# Patient Record
Sex: MALE | Race: WHITE | NOT HISPANIC OR LATINO | URBAN - METROPOLITAN AREA
[De-identification: names, ages, dates, MRNs, and addresses within clinical notes are randomized per-mention and may not be internally consistent; named-entity substitution may affect disease eponyms.]

---

## 2018-12-29 ENCOUNTER — WALK IN (OUTPATIENT)
Dept: URGENT CARE | Age: 23
End: 2018-12-29

## 2018-12-29 VITALS
HEART RATE: 78 BPM | DIASTOLIC BLOOD PRESSURE: 72 MMHG | WEIGHT: 150 LBS | TEMPERATURE: 98.4 F | BODY MASS INDEX: 20.32 KG/M2 | RESPIRATION RATE: 18 BRPM | HEIGHT: 72 IN | OXYGEN SATURATION: 97 % | SYSTOLIC BLOOD PRESSURE: 128 MMHG

## 2018-12-29 DIAGNOSIS — H66.001 ACUTE SUPPURATIVE OTITIS MEDIA OF RIGHT EAR WITHOUT SPONTANEOUS RUPTURE OF TYMPANIC MEMBRANE, RECURRENCE NOT SPECIFIED: Primary | ICD-10-CM

## 2018-12-29 PROCEDURE — 99204 OFFICE O/P NEW MOD 45 MIN: CPT | Performed by: NURSE PRACTITIONER

## 2018-12-29 RX ORDER — AMOXICILLIN 875 MG/1
875 TABLET, COATED ORAL 2 TIMES DAILY
Qty: 20 TABLET | Refills: 0 | Status: SHIPPED | OUTPATIENT
Start: 2018-12-29 | End: 2019-01-08

## 2018-12-29 SDOH — HEALTH STABILITY: MENTAL HEALTH: HOW OFTEN DO YOU HAVE A DRINK CONTAINING ALCOHOL?: NEVER

## 2018-12-29 ASSESSMENT — ENCOUNTER SYMPTOMS
DIARRHEA: 0
APPETITE CHANGE: 0
COUGH: 0
BACK PAIN: 0
VOMITING: 0
RHINORRHEA: 1
WOUND: 0
HEADACHES: 0
NUMBNESS: 0
ABDOMINAL PAIN: 0
FEVER: 0
CHILLS: 0
SHORTNESS OF BREATH: 0
DIZZINESS: 0
WEAKNESS: 0
EYE PAIN: 0
SORE THROAT: 0
NAUSEA: 0
FATIGUE: 0

## 2020-09-21 ENCOUNTER — OFFICE VISIT (OUTPATIENT)
Dept: INTERNAL MEDICINE | Age: 25
End: 2020-09-21

## 2020-09-21 ENCOUNTER — TELEPHONE (OUTPATIENT)
Dept: INTERNAL MEDICINE | Age: 25
End: 2020-09-21

## 2020-09-21 VITALS
OXYGEN SATURATION: 99 % | HEART RATE: 75 BPM | BODY MASS INDEX: 20.91 KG/M2 | HEIGHT: 72 IN | TEMPERATURE: 98 F | RESPIRATION RATE: 16 BRPM | DIASTOLIC BLOOD PRESSURE: 80 MMHG | SYSTOLIC BLOOD PRESSURE: 122 MMHG | WEIGHT: 154.4 LBS

## 2020-09-21 DIAGNOSIS — F10.20 UNCOMPLICATED ALCOHOL DEPENDENCE (CMD): Primary | ICD-10-CM

## 2020-09-21 PROCEDURE — 99202 OFFICE O/P NEW SF 15 MIN: CPT | Performed by: INTERNAL MEDICINE

## 2020-09-21 SDOH — HEALTH STABILITY: MENTAL HEALTH: HOW OFTEN DO YOU HAVE A DRINK CONTAINING ALCOHOL?: 4 OR MORE TIMES A WEEK

## 2020-09-21 SDOH — HEALTH STABILITY: PHYSICAL HEALTH: ON AVERAGE, HOW MANY MINUTES DO YOU ENGAGE IN EXERCISE AT THIS LEVEL?: 90 MIN

## 2020-09-21 SDOH — HEALTH STABILITY: PHYSICAL HEALTH: ON AVERAGE, HOW MANY DAYS PER WEEK DO YOU ENGAGE IN MODERATE TO STRENUOUS EXERCISE (LIKE A BRISK WALK)?: 6 DAYS

## 2020-09-21 ASSESSMENT — PATIENT HEALTH QUESTIONNAIRE - PHQ9
2. FEELING DOWN, DEPRESSED OR HOPELESS: NOT AT ALL
CLINICAL INTERPRETATION OF PHQ9 SCORE: NO FURTHER SCREENING NEEDED
1. LITTLE INTEREST OR PLEASURE IN DOING THINGS: NOT AT ALL
SUM OF ALL RESPONSES TO PHQ9 QUESTIONS 1 AND 2: 0
CLINICAL INTERPRETATION OF PHQ2 SCORE: NO FURTHER SCREENING NEEDED
SUM OF ALL RESPONSES TO PHQ9 QUESTIONS 1 AND 2: 0

## 2020-09-26 ENCOUNTER — TELEPHONE (OUTPATIENT)
Dept: INTERNAL MEDICINE | Age: 25
End: 2020-09-26

## 2020-10-10 ENCOUNTER — APPOINTMENT (OUTPATIENT)
Dept: INTERNAL MEDICINE | Age: 25
End: 2020-10-10

## 2024-01-24 ENCOUNTER — OFFICE VISIT (OUTPATIENT)
Dept: FAMILY MEDICINE CLINIC | Facility: CLINIC | Age: 29
End: 2024-01-24
Payer: COMMERCIAL

## 2024-01-24 VITALS
OXYGEN SATURATION: 97 % | SYSTOLIC BLOOD PRESSURE: 122 MMHG | WEIGHT: 205 LBS | BODY MASS INDEX: 27.77 KG/M2 | HEART RATE: 92 BPM | DIASTOLIC BLOOD PRESSURE: 80 MMHG | TEMPERATURE: 97 F | HEIGHT: 72 IN

## 2024-01-24 DIAGNOSIS — Z11.3 SCREEN FOR STD (SEXUALLY TRANSMITTED DISEASE): ICD-10-CM

## 2024-01-24 DIAGNOSIS — Z00.00 GENERAL MEDICAL EXAM: ICD-10-CM

## 2024-01-24 DIAGNOSIS — Z76.89 ENCOUNTER TO ESTABLISH CARE: Primary | ICD-10-CM

## 2024-01-24 DIAGNOSIS — R41.840 CONCENTRATION DEFICIT: ICD-10-CM

## 2024-01-24 DIAGNOSIS — L98.9 SKIN LESION OF FACE: ICD-10-CM

## 2024-01-24 DIAGNOSIS — F41.9 ANXIETY: ICD-10-CM

## 2024-01-24 DIAGNOSIS — Z12.5 SCREENING FOR PROSTATE CANCER: ICD-10-CM

## 2024-01-24 DIAGNOSIS — Z23 NEED FOR VACCINATION: ICD-10-CM

## 2024-01-24 PROCEDURE — 3008F BODY MASS INDEX DOCD: CPT | Performed by: NURSE PRACTITIONER

## 2024-01-24 PROCEDURE — 3074F SYST BP LT 130 MM HG: CPT | Performed by: NURSE PRACTITIONER

## 2024-01-24 PROCEDURE — 99385 PREV VISIT NEW AGE 18-39: CPT | Performed by: NURSE PRACTITIONER

## 2024-01-24 PROCEDURE — 3079F DIAST BP 80-89 MM HG: CPT | Performed by: NURSE PRACTITIONER

## 2024-01-24 NOTE — PROGRESS NOTES
HPI:   Patient is here to establish care and for physical today.    Concerns: Needs referral for psychiatrist and dermatology.   Has spot on face and abdomen he would like derm to look at  Has history of ADHD, alcohol abuse and anxiety. Not interested in therapy at this time but would like to see psychiatrist.    Exercise: walking on treadmill    Diet: balanced  Occupation: lawn care in summer, laid off in winter    Wt Readings from Last 6 Encounters:   01/24/24 205 lb (93 kg)     Body mass index is 27.8 kg/m².   Immunization History   Administered Date(s) Administered    DTAP INFANRIX 04/13/1993, 06/15/1993, 08/25/1993, 10/14/1994, 06/24/1998    HEP B, Ped/Adol 02/17/1993, 04/13/1993, 08/21/1997    Hib, Unspecified Formulation 04/13/1993, 06/15/1993, 08/25/1993, 06/27/1994    Influenza 04/13/1993, 06/15/1993, 08/25/1993, 06/27/1994    MMR 06/29/1994, 06/24/1998    Meningococcal-Menactra 04/17/2007    OPV 04/13/1993, 06/15/1993, 10/04/1994, 06/24/1998    TDAP 04/17/2007   Pended Date(s) Pended    TDAP 01/24/2024     No results found for: \"CHOLEST\", \"HDL\", \"LDL\", \"TRIGLY\", \"AST\", \"ALT\"     Allergies:  No Known Allergies   Current Meds:  No current outpatient medications on file prior to visit.     No current facility-administered medications on file prior to visit.        History:  History reviewed. No pertinent past medical history.   History reviewed. No pertinent surgical history.   History reviewed. No pertinent family history.   No family status information on file.      Social History     Socioeconomic History    Marital status: Single   Tobacco Use    Smoking status: Never   Vaping Use    Vaping Use: Never used   Substance and Sexual Activity    Alcohol use: Yes     Alcohol/week: 2.0 - 3.0 standard drinks of alcohol     Types: 2 - 3 Cans of beer per week     Comment: few times a week    Drug use: No        REVIEW OF SYSTEMS:   Review of Systems   Constitutional:  Negative for appetite change, chills, fatigue,  fever and unexpected weight change.   HENT:  Negative for congestion, ear pain, postnasal drip, rhinorrhea, sore throat and trouble swallowing.    Respiratory:  Negative for cough and shortness of breath.    Cardiovascular:  Negative for chest pain and palpitations.   Gastrointestinal:  Negative for abdominal pain, constipation, diarrhea, nausea and vomiting.   Endocrine: Negative for cold intolerance and heat intolerance.   Genitourinary:  Negative for dysuria and frequency.   Musculoskeletal:  Negative for myalgias.   Skin:  Negative for rash.        + skin lesion   Neurological:  Negative for headaches.   Psychiatric/Behavioral:  Positive for decreased concentration. Negative for dysphoric mood, sleep disturbance and suicidal ideas. The patient is nervous/anxious.         Objective   EXAM:   /80   Pulse 92   Temp 97.4 °F (36.3 °C)   Ht 6' (1.829 m)   Wt 205 lb (93 kg)   SpO2 97%   BMI 27.80 kg/m²   Ideal body weight: 77.6 kg (171 lb 1.2 oz)  Adjusted ideal body weight: 83.8 kg (184 lb 10.3 oz)   Physical Exam  Constitutional:       General: He is not in acute distress.     Appearance: Normal appearance. He is well-developed and well-groomed. He is not ill-appearing.   HENT:      Right Ear: Tympanic membrane, ear canal and external ear normal.      Left Ear: Tympanic membrane, ear canal and external ear normal.      Nose: Nose normal.      Mouth/Throat:      Mouth: Mucous membranes are moist.      Pharynx: Oropharynx is clear.   Eyes:      Conjunctiva/sclera: Conjunctivae normal.      Pupils: Pupils are equal, round, and reactive to light.   Neck:      Thyroid: No thyromegaly.   Cardiovascular:      Rate and Rhythm: Normal rate and regular rhythm.      Heart sounds: Normal heart sounds.   Pulmonary:      Effort: Pulmonary effort is normal.      Breath sounds: Normal breath sounds.   Abdominal:      General: Bowel sounds are normal.      Palpations: Abdomen is soft.      Tenderness: There is no  abdominal tenderness.   Musculoskeletal:         General: Normal range of motion.      Cervical back: Normal range of motion.   Skin:     General: Skin is warm and dry.   Neurological:      Mental Status: He is alert and oriented to person, place, and time.      Cranial Nerves: No cranial nerve deficit.   Psychiatric:         Mood and Affect: Mood normal. Affect is flat.         Speech: Speech normal.         Behavior: Behavior normal.          ASSESSMENT AND PLAN     Diagnoses and all orders for this visit:    Encounter to establish care    General medical exam  -     TSH W Reflex To Free T4  -     Lipid Panel  -     Comp Metabolic Panel (14)  -     CBC With Differential With Platelet    Need for vaccination  -     TdaP (Adacel, Boostrix) [36784]    Anxiety  -     [facility] Referral - In Network    Concentration deficit  -     [facility] Referral - In Network    Skin lesion of face  -     Cancel: DERM - EXTERNAL  -     DERM - EXTERNAL    Screening for prostate cancer    Screen for STD (sexually transmitted disease)  -     T Pallidum Screening [place]  -     HIV AG AB Combo  -     Urine Chlamydia/GC Amplification  -     Hepatitis B Surface Antigen  -     Herpes Simplex Virus I/II IgG (Blood antibodies)    Labs ordered  See psych and derm, referrals placed

## 2024-01-26 LAB
ABSOLUTE BASOPHILS: 71 CELLS/UL (ref 0–200)
ABSOLUTE EOSINOPHILS: 99 CELLS/UL (ref 15–500)
ABSOLUTE LYMPHOCYTES: 1311 CELLS/UL (ref 850–3900)
ABSOLUTE MONOCYTES: 310 CELLS/UL (ref 200–950)
ABSOLUTE NEUTROPHILS: 2909 CELLS/UL (ref 1500–7800)
ALBUMIN/GLOBULIN RATIO: 1.5 (CALC) (ref 1–2.5)
ALBUMIN: 4.8 G/DL (ref 3.6–5.1)
ALKALINE PHOSPHATASE: 79 U/L (ref 36–130)
ALT: 52 U/L (ref 9–46)
AST: 27 U/L (ref 10–40)
BASOPHILS: 1.5 %
BILIRUBIN, TOTAL: 1 MG/DL (ref 0.2–1.2)
BUN: 15 MG/DL (ref 7–25)
CALCIUM: 10.1 MG/DL (ref 8.6–10.3)
CARBON DIOXIDE: 28 MMOL/L (ref 20–32)
CHLAMYDIA TRACHOMATIS$RNA, TMA: NOT DETECTED
CHLORIDE: 102 MMOL/L (ref 98–110)
CHOL/HDLC RATIO: 4.3 (CALC)
CHOLESTEROL, TOTAL: 182 MG/DL
CREATININE: 0.97 MG/DL (ref 0.6–1.26)
EGFR: 108 ML/MIN/1.73M2
EOSINOPHILS: 2.1 %
GLOBULIN: 3.2 G/DL (CALC) (ref 1.9–3.7)
GLUCOSE: 98 MG/DL (ref 65–139)
HDL CHOLESTEROL: 42 MG/DL
HEMATOCRIT: 44.3 % (ref 38.5–50)
HEMOGLOBIN: 15 G/DL (ref 13.2–17.1)
HSV 1 IGG TYPE SPECIFIC$AB: <0.9 INDEX
HSV 2 IGG TYPE SPECIFIC$AB: <0.9 INDEX
LDL-CHOLESTEROL: 115 MG/DL (CALC)
LYMPHOCYTES: 27.9 %
MCH: 30.4 PG (ref 27–33)
MCHC: 33.9 G/DL (ref 32–36)
MCV: 89.7 FL (ref 80–100)
MONOCYTES: 6.6 %
MPV: 9.5 FL (ref 7.5–12.5)
NEISSERIA GONORRHOEAE$RNA, TMA: NOT DETECTED
NEUTROPHILS: 61.9 %
NON-HDL CHOLESTEROL: 140 MG/DL (CALC)
PLATELET COUNT: 323 THOUSAND/UL (ref 140–400)
POTASSIUM: 4.3 MMOL/L (ref 3.5–5.3)
PROTEIN, TOTAL: 8 G/DL (ref 6.1–8.1)
RDW: 12.2 % (ref 11–15)
RED BLOOD CELL COUNT: 4.94 MILLION/UL (ref 4.2–5.8)
SODIUM: 140 MMOL/L (ref 135–146)
T. PALLIDUM AB, EIA: NEGATIVE
TRIGLYCERIDES: 134 MG/DL
TSH W/REFLEX TO FT4: 2.24 MIU/L (ref 0.4–4.5)
WHITE BLOOD CELL COUNT: 4.7 THOUSAND/UL (ref 3.8–10.8)

## 2024-01-27 ENCOUNTER — TELEPHONE (OUTPATIENT)
Dept: FAMILY MEDICINE CLINIC | Facility: CLINIC | Age: 29
End: 2024-01-27

## 2024-01-27 DIAGNOSIS — R74.01 ELEVATED ALT MEASUREMENT: Primary | ICD-10-CM

## 2024-01-27 NOTE — TELEPHONE ENCOUNTER
----- Message from DIANDRA Guerrero sent at 1/27/2024  8:10 AM CST -----  Mildly elevated LDL, work on heart healthy diet. Recheck 1 year  Chemistries normal except for mild ALT. Recheck in 1 month for comparison  Thyroid normal  No anemia  Syphilis, HIV, Chlamydia, Gonorrhea, Hepatitis B, HSV screening negative

## 2024-02-01 ENCOUNTER — TELEPHONE (OUTPATIENT)
Dept: FAMILY MEDICINE CLINIC | Facility: CLINIC | Age: 29
End: 2024-02-01

## 2024-02-01 DIAGNOSIS — L98.9 SKIN LESION OF FACE: Primary | ICD-10-CM

## 2024-02-01 NOTE — TELEPHONE ENCOUNTER
Pt called said he would need a new referral with DR. Velázquez with  [facility].fax referral to [phone]. Per pt other referral was book out far

## 2024-02-03 PROBLEM — F19.20 POLYSUBSTANCE DEPENDENCE (HCC): Status: ACTIVE | Noted: 2024-02-03

## 2024-02-03 PROBLEM — F19.90 POLYSUBSTANCE USE DISORDER: Status: ACTIVE | Noted: 2024-02-03

## 2024-02-03 PROBLEM — F41.1 GAD (GENERALIZED ANXIETY DISORDER): Status: ACTIVE | Noted: 2024-02-03

## 2024-02-09 ENCOUNTER — TELEPHONE (OUTPATIENT)
Dept: FAMILY MEDICINE CLINIC | Facility: CLINIC | Age: 29
End: 2024-02-09

## 2024-02-09 NOTE — TELEPHONE ENCOUNTER
Pt called and scheduled apt for   Future Appointments   Date Time Provider Department Center   2/10/2024  8:15 AM Celina Menard APRN [facility]   2/21/2024  4:30 PM Melina Andrews APRN [facility]     Pt wondering if he can get tetanus shot at apt. Please advise

## 2024-02-10 ENCOUNTER — OFFICE VISIT (OUTPATIENT)
Dept: FAMILY MEDICINE CLINIC | Facility: CLINIC | Age: 29
End: 2024-02-10
Payer: COMMERCIAL

## 2024-02-10 VITALS
OXYGEN SATURATION: 98 % | SYSTOLIC BLOOD PRESSURE: 128 MMHG | WEIGHT: 202 LBS | BODY MASS INDEX: 27.36 KG/M2 | HEIGHT: 72 IN | TEMPERATURE: 97 F | HEART RATE: 87 BPM | DIASTOLIC BLOOD PRESSURE: 76 MMHG | RESPIRATION RATE: 16 BRPM

## 2024-02-10 DIAGNOSIS — Z23 NEED FOR VACCINATION: ICD-10-CM

## 2024-02-10 DIAGNOSIS — R51.9 ACUTE NONINTRACTABLE HEADACHE, UNSPECIFIED HEADACHE TYPE: Primary | ICD-10-CM

## 2024-02-10 PROCEDURE — 90471 IMMUNIZATION ADMIN: CPT | Performed by: NURSE PRACTITIONER

## 2024-02-10 PROCEDURE — 99213 OFFICE O/P EST LOW 20 MIN: CPT | Performed by: NURSE PRACTITIONER

## 2024-02-10 PROCEDURE — 90715 TDAP VACCINE 7 YRS/> IM: CPT | Performed by: NURSE PRACTITIONER

## 2024-02-10 NOTE — PROGRESS NOTES
HPI:     Patient is here for pain on left side of temple. Started a few weeks ago and is improving. Reports having a small pimple in the area. He popped it and some white pus drained. Not sure if it is related. Pain wraps around ear. No pain with eating, drinking or swallowing. Overall pain is improving. Has not taken anything for pain.    Also here to get vaccines updated.     Current Outpatient Medications   Medication Sig Dispense Refill    escitalopram (LEXAPRO) 10 MG Oral Tab Take half tab[5mg] by mouth daily for 7 days then increase to 10 mg 30 tablet 0    buPROPion ER (WELLBUTRIN XL) 150 MG Oral Tablet 24 Hr Take 1 tablet (150 mg total) by mouth every morning. 30 tablet 0      History reviewed. No pertinent past medical history.   History reviewed. No pertinent surgical history.   Family History   Problem Relation Age of Onset    Anxiety Mother       Social History     Socioeconomic History    Marital status: Single   Tobacco Use    Smoking status: Never   Vaping Use    Vaping Use: Never used   Substance and Sexual Activity    Alcohol use: Yes     Alcohol/week: 2.0 - 3.0 standard drinks of alcohol     Types: 2 - 3 Cans of beer per week     Comment: few times a week    Drug use: Yes     Types: Cannabis, LSD, \"Crack\" cocaine, Opiods     Comment: currently sober from all substances         REVIEW OF SYSTEMS:   Review of Systems   Constitutional:  Negative for chills, fatigue and fever.   HENT:  Negative for congestion, ear pain, rhinorrhea, sore throat and trouble swallowing.    Respiratory:  Negative for cough.    Cardiovascular:  Negative for chest pain.   Musculoskeletal:  Negative for neck pain and neck stiffness.       EXAM:   /76   Pulse 87   Temp 97 °F (36.1 °C) (Temporal)   Resp 16   Ht 6' (1.829 m)   Wt 202 lb (91.6 kg)   SpO2 98%   BMI 27.40 kg/m²   Physical Exam  Constitutional:       General: He is not in acute distress.     Appearance: Normal appearance.   HENT:      Head:      Jaw:  There is normal jaw occlusion.        Right Ear: Tympanic membrane, ear canal and external ear normal.      Left Ear: Tympanic membrane, ear canal and external ear normal.      Nose: Nose normal.      Mouth/Throat:      Mouth: Mucous membranes are moist.      Pharynx: Oropharynx is clear.   Cardiovascular:      Rate and Rhythm: Normal rate and regular rhythm.      Heart sounds: Normal heart sounds.   Pulmonary:      Effort: Pulmonary effort is normal.      Breath sounds: Normal breath sounds.   Neurological:      Mental Status: He is alert.         ASSESSMENT AND PLAN:   Diagnoses and all orders for this visit:    Acute nonintractable headache, unspecified headache type    Improving  Can apply ice/heat, PRN Nsaid  If persisting, follow up

## 2024-02-16 ENCOUNTER — TELEPHONE (OUTPATIENT)
Dept: FAMILY MEDICINE CLINIC | Facility: CLINIC | Age: 29
End: 2024-02-16

## 2024-02-16 NOTE — TELEPHONE ENCOUNTER
LMT[facility]-referral authorized through his ins   can you reload his ins? Thanks!      Per Collette Slameron: Ari Oneil! This referral is now Authorized. You may want to have your  reload the insurance as a [facility] HMO. It is loaded incorrectly as a [facility] Choice which does not require a Authorization. These will never fall into our [employer] for processing. Have a great weekend! :)

## 2024-02-16 NOTE — TELEPHONE ENCOUNTER
Message sent to tasneem gregg in ref dept  Referral was placed for Dr Amarjit Velázquez on 2/1/24

## 2024-02-23 ENCOUNTER — TELEPHONE (OUTPATIENT)
Dept: FAMILY MEDICINE CLINIC | Facility: CLINIC | Age: 29
End: 2024-02-23

## 2024-02-23 NOTE — TELEPHONE ENCOUNTER
Repeat labs due  MCM sent  Future Appointments   Date Time Provider Department Center   3/13/2024  5:00 PM Melina Andrews APRN [facility]

## 2024-05-28 ENCOUNTER — TELEPHONE (OUTPATIENT)
Dept: FAMILY MEDICINE CLINIC | Facility: CLINIC | Age: 29
End: 2024-05-28

## 2024-05-28 DIAGNOSIS — M54.50 LOW BACK PAIN, UNSPECIFIED BACK PAIN LATERALITY, UNSPECIFIED CHRONICITY, UNSPECIFIED WHETHER SCIATICA PRESENT: Primary | ICD-10-CM

## 2024-05-28 DIAGNOSIS — M25.519 SHOULDER PAIN, UNSPECIFIED CHRONICITY, UNSPECIFIED LATERALITY: ICD-10-CM

## 2024-05-28 NOTE — TELEPHONE ENCOUNTER
Patient wants to get a referral for a Chiropractor,  states he is having lower back and shoulder pain.  Patient has HMO insurance   Last px 1/24/24  Will patient need to be seen prior to referral?

## 2024-05-31 ENCOUNTER — TELEPHONE (OUTPATIENT)
Dept: FAMILY MEDICINE CLINIC | Facility: CLINIC | Age: 29
End: 2024-05-31

## 2024-05-31 NOTE — TELEPHONE ENCOUNTER
Referral faxed    PATIENT NAME:  LISSA CORDERO/ [phone] (home)/ There is no work phone number on file.  PATIENT :  1995  REFERRAL ID #:  [id number]  REFERRAL STATUS:  Authorized [1]  REVIEW REFERRAL NOTES FOR MORE INFORMATION:  DATE AUTHORIZED:  2024 // EXPIRATION DATE: 2025

## 2024-05-31 NOTE — TELEPHONE ENCOUNTER
Bartolome from [facility] called and wanted to check status of patient's referral for Dr. Mendoza. Per Bartloome, she needs the referral submitted through the portal and she does not see anything there. Bartolome states best call back number for questions is [phone] and fax number is [phone]. Please advise

## 2024-06-19 ENCOUNTER — OFFICE VISIT (OUTPATIENT)
Dept: FAMILY MEDICINE CLINIC | Facility: CLINIC | Age: 29
End: 2024-06-19

## 2024-06-19 VITALS
SYSTOLIC BLOOD PRESSURE: 120 MMHG | BODY MASS INDEX: 27 KG/M2 | OXYGEN SATURATION: 97 % | TEMPERATURE: 98 F | HEART RATE: 74 BPM | WEIGHT: 197 LBS | DIASTOLIC BLOOD PRESSURE: 72 MMHG

## 2024-06-19 DIAGNOSIS — M54.50 CHRONIC MIDLINE LOW BACK PAIN WITHOUT SCIATICA: Primary | ICD-10-CM

## 2024-06-19 DIAGNOSIS — M54.9 UPPER BACK PAIN: ICD-10-CM

## 2024-06-19 DIAGNOSIS — G89.29 CHRONIC MIDLINE LOW BACK PAIN WITHOUT SCIATICA: Primary | ICD-10-CM

## 2024-06-19 PROCEDURE — 3078F DIAST BP <80 MM HG: CPT | Performed by: NURSE PRACTITIONER

## 2024-06-19 PROCEDURE — 3074F SYST BP LT 130 MM HG: CPT | Performed by: NURSE PRACTITIONER

## 2024-06-19 PROCEDURE — 99213 OFFICE O/P EST LOW 20 MIN: CPT | Performed by: NURSE PRACTITIONER

## 2024-06-19 NOTE — PROGRESS NOTES
HPI:   Back Pain  This is a chronic problem. Episode onset: several years ago. The problem has been gradually worsening (over last few months) since onset. The pain is present in the lumbar spine. The quality of the pain is described as aching. The pain does not radiate. The symptoms are aggravated by bending and position (walking longer than 30 minutes). Stiffness is present All day. Pertinent negatives include no bladder incontinence, bowel incontinence, fever, numbness, paresis, paresthesias or tingling. He has tried nothing for the symptoms.   Shoulder Pain   Pain location: between shoulder blades. This is a chronic problem. Episode onset: several years ago. The problem has been waxing and waning. The quality of the pain is described as aching. Associated symptoms include a limited range of motion (sometimes, it is painful). Pertinent negatives include no fever, numbness or tingling. The symptoms are aggravated by activity. He has tried nothing for the symptoms.        Current Outpatient Medications   Medication Sig Dispense Refill    escitalopram (LEXAPRO) 10 MG Oral Tab Take 1 tablet (10 mg total) by mouth daily. 90 tablet 0    buPROPion ER (WELLBUTRIN XL) 300 MG Oral Tablet 24 Hr Take 1 tablet (300 mg total) by mouth every morning. 90 tablet 0    hydrOXYzine Pamoate (VISTARIL) 25 MG Oral Cap Take 1-2 capsules (25-50 mg total) by mouth daily as needed for Anxiety. 60 capsule 1      History reviewed. No pertinent past medical history.   History reviewed. No pertinent surgical history.   Family History   Problem Relation Age of Onset    Anxiety Mother         Suspected      Social History     Socioeconomic History    Marital status: Single   Tobacco Use    Smoking status: Never   Vaping Use    Vaping status: Never Used   Substance and Sexual Activity    Alcohol use: Yes     Alcohol/week: 2.0 - 3.0 standard drinks of alcohol     Types: 2 - 3 Cans of beer per week     Comment: few times a week    Drug use: Yes      Types: Cannabis, LSD, \"Crack\" cocaine, Opiods     Comment: currently sober from all substances     Social Determinants of Health     Physical Activity: Sufficiently Active (9/21/2020)    [facility] [employer] [employer]    Exercise Vital Sign     Days of Exercise per Week: 6 days     Minutes of Exercise per Session: 90 min         REVIEW OF SYSTEMS:   Review of Systems   Constitutional:  Negative for chills, fatigue and fever.   Gastrointestinal:  Negative for bowel incontinence.   Genitourinary:  Negative for bladder incontinence.   Musculoskeletal:  Positive for back pain. Negative for gait problem and joint swelling.   Neurological:  Negative for tingling, numbness and paresthesias.       EXAM:   /72   Pulse 74   Temp 97.6 °F (36.4 °C)   Wt 197 lb (89.4 kg)   SpO2 97%   BMI 26.72 kg/m²   Physical Exam  Constitutional:       General: He is not in acute distress.     Appearance: Normal appearance.   Musculoskeletal:      Right shoulder: No bony tenderness or crepitus. Normal range of motion.      Left shoulder: No bony tenderness or crepitus. Normal range of motion.      Lumbar back: No swelling or spasms. Decreased range of motion (decreased forward flexion). Negative right straight leg raise test and negative left straight leg raise test.   Neurological:      General: No focal deficit present.      Mental Status: He is alert.   Psychiatric:         Mood and Affect: Mood normal.         ASSESSMENT AND PLAN:   Diagnoses and all orders for this visit:    Chronic midline low back pain without sciatica  -     Physical Therapy Referral - [facility]    Upper back pain  -     Physical Therapy Referral - [facility]    Start physical therapy  Call/message with update in 4 weeks

## 2024-11-20 ENCOUNTER — OFFICE VISIT (OUTPATIENT)
Dept: FAMILY MEDICINE CLINIC | Facility: CLINIC | Age: 29
End: 2024-11-20
Payer: COMMERCIAL

## 2024-11-20 VITALS
WEIGHT: 201 LBS | HEART RATE: 73 BPM | OXYGEN SATURATION: 96 % | DIASTOLIC BLOOD PRESSURE: 80 MMHG | SYSTOLIC BLOOD PRESSURE: 116 MMHG | TEMPERATURE: 97 F | BODY MASS INDEX: 27 KG/M2

## 2024-11-20 DIAGNOSIS — M79.671 FOOT PAIN, BILATERAL: ICD-10-CM

## 2024-11-20 DIAGNOSIS — M79.671 HEEL PAIN, BILATERAL: Primary | ICD-10-CM

## 2024-11-20 DIAGNOSIS — M79.672 FOOT PAIN, BILATERAL: ICD-10-CM

## 2024-11-20 DIAGNOSIS — M79.672 HEEL PAIN, BILATERAL: Primary | ICD-10-CM

## 2024-11-20 PROCEDURE — 99212 OFFICE O/P EST SF 10 MIN: CPT | Performed by: NURSE PRACTITIONER

## 2024-11-20 PROCEDURE — 3074F SYST BP LT 130 MM HG: CPT | Performed by: NURSE PRACTITIONER

## 2024-11-20 PROCEDURE — 3079F DIAST BP 80-89 MM HG: CPT | Performed by: NURSE PRACTITIONER

## 2024-11-20 NOTE — PROGRESS NOTES
HPI:     Patient is here to discuss foot pain and referral. Has been experiencing bilateral heel pain that radiates to toes. Started few months ago. Not worsening but not improving. Worst throughout the day as he has been standing for long periods of time. Trying ice and home stretches with minimal improvement.    Current Outpatient Medications   Medication Sig Dispense Refill    buPROPion ER (WELLBUTRIN XL) 300 MG Oral Tablet 24 Hr Take 1 tablet (300 mg total) by mouth every morning. 90 tablet 0    escitalopram (LEXAPRO) 20 MG Oral Tab Take 1 tablet (20 mg total) by mouth daily. 90 tablet 0    hydrOXYzine Pamoate (VISTARIL) 25 MG Oral Cap Take 1-2 capsules (25-50 mg total) by mouth daily as needed for Anxiety. 60 capsule 1      No past medical history on file.   No past surgical history on file.   Family History   Problem Relation Age of Onset    Anxiety Mother         Suspected      Social History     Socioeconomic History    Marital status: Single   Tobacco Use    Smoking status: Never   Vaping Use    Vaping status: Never Used   Substance and Sexual Activity    Alcohol use: Yes     Alcohol/week: 2.0 - 3.0 standard drinks of alcohol     Types: 2 - 3 Cans of beer per week     Comment: few times a week    Drug use: Yes     Types: Cannabis, LSD, \"Crack\" cocaine, Opiods     Comment: currently sober from all substances     Social Drivers of Health     Physical Activity: Sufficiently Active (9/21/2020)    Received from [employer], [employer]    Exercise Vital Sign     Days of Exercise per Week: 6 days     Minutes of Exercise per Session: 90 min         REVIEW OF SYSTEMS:   Review of Systems   Musculoskeletal:  Negative for gait problem and joint swelling.       EXAM:   /80   Pulse 73   Temp 97 °F (36.1 °C)   Wt 201 lb (91.2 kg)   SpO2 96%   BMI 27.26 kg/m²   Physical Exam  Constitutional:       General: He is not in acute distress.     Appearance: Normal appearance.   Musculoskeletal:       Right foot: Normal range of motion. Tenderness present. No bony tenderness.      Left foot: Normal range of motion. Tenderness present. No bony tenderness.        Legs:    Neurological:      Mental Status: He is alert.         ASSESSMENT AND PLAN:   Diagnoses and all orders for this visit:    Heel pain, bilateral  -     Podiatry Referral - In Network    Foot pain, bilateral  -     Podiatry Referral - In Network    Referral for podiatry placed  Recommend plantar fasciitis braces at night

## 2024-12-10 ENCOUNTER — OFFICE VISIT (OUTPATIENT)
Dept: PODIATRY CLINIC | Facility: CLINIC | Age: 29
End: 2024-12-10

## 2024-12-10 ENCOUNTER — PATIENT MESSAGE (OUTPATIENT)
Dept: FAMILY MEDICINE CLINIC | Facility: CLINIC | Age: 29
End: 2024-12-10

## 2024-12-10 DIAGNOSIS — M76.61 ACHILLES TENDONITIS, BILATERAL: ICD-10-CM

## 2024-12-10 DIAGNOSIS — M79.671 HEEL PAIN, BILATERAL: Primary | ICD-10-CM

## 2024-12-10 DIAGNOSIS — M79.672 FOOT PAIN, BILATERAL: ICD-10-CM

## 2024-12-10 DIAGNOSIS — M79.671 FOOT PAIN, BILATERAL: ICD-10-CM

## 2024-12-10 DIAGNOSIS — M79.672 HEEL PAIN, BILATERAL: Primary | ICD-10-CM

## 2024-12-10 DIAGNOSIS — M72.2 PLANTAR FASCIITIS, BILATERAL: Primary | ICD-10-CM

## 2024-12-10 DIAGNOSIS — M76.62 ACHILLES TENDONITIS, BILATERAL: ICD-10-CM

## 2024-12-10 PROCEDURE — 99204 OFFICE O/P NEW MOD 45 MIN: CPT | Performed by: PODIATRIST

## 2024-12-10 RX ORDER — METHYLPREDNISOLONE 4 MG/1
TABLET ORAL
Qty: 21 TABLET | Refills: 0 | Status: SHIPPED | OUTPATIENT
Start: 2024-12-10

## 2024-12-16 ENCOUNTER — TELEPHONE (OUTPATIENT)
Dept: FAMILY MEDICINE CLINIC | Facility: CLINIC | Age: 29
End: 2024-12-16

## 2024-12-23 ENCOUNTER — TELEPHONE (OUTPATIENT)
Dept: FAMILY MEDICINE CLINIC | Facility: CLINIC | Age: 29
End: 2024-12-23

## 2024-12-23 DIAGNOSIS — M79.672 HEEL PAIN, BILATERAL: Primary | ICD-10-CM

## 2024-12-23 DIAGNOSIS — M79.671 HEEL PAIN, BILATERAL: Primary | ICD-10-CM

## 2024-12-23 DIAGNOSIS — M79.671 FOOT PAIN, BILATERAL: ICD-10-CM

## 2024-12-23 DIAGNOSIS — M79.672 FOOT PAIN, BILATERAL: ICD-10-CM

## 2024-12-23 NOTE — TELEPHONE ENCOUNTER
Patient called said that podiatrist he needs to see physical therapy.     [facility] so referrals has to come from pcp. Per patient he would like to be seen some where closer to him.

## 2024-12-23 NOTE — TELEPHONE ENCOUNTER
See office visit notes from 12/10/24 with Kathleen Snowden    -Patient elected for physical therapy, over-the-counter inserts and Medrol Dosepak.  Discussed that if his symptoms do not improve within a few weeks recommend reaching out for possible further imaging.     Referral pended

## 2024-12-23 NOTE — TELEPHONE ENCOUNTER
Left message to call office back  Referral for PT with [facility] placed  Please give scheduling phone number   [phone]

## 2025-01-28 ENCOUNTER — TELEPHONE (OUTPATIENT)
Dept: PHYSICAL THERAPY | Facility: HOSPITAL | Age: 30
End: 2025-01-28

## 2025-01-29 ENCOUNTER — OFFICE VISIT (OUTPATIENT)
Facility: LOCATION | Age: 30
End: 2025-01-29
Attending: PODIATRIST
Payer: COMMERCIAL

## 2025-01-29 DIAGNOSIS — M72.2 PLANTAR FASCIITIS, BILATERAL: Primary | ICD-10-CM

## 2025-01-29 PROCEDURE — 97162 PT EVAL MOD COMPLEX 30 MIN: CPT

## 2025-01-29 PROCEDURE — 97110 THERAPEUTIC EXERCISES: CPT

## 2025-01-29 NOTE — PROGRESS NOTES
LOWER EXTREMITY EVALUATION:     Diagnosis:   Heel pain, bilateral (M79.671,M79.672)  Foot pain, bilateral (M79.671,M79.672) Patient:  Americo Martinez (29 year old, male)        Referring Provider: Kathleen Snowden  Today's Date   1/29/2025    Precautions:  None   Date of Evaluation: 01/29/25  Next MD visit: No data recorded  Date of Surgery: N/A     PATIENT SUMMARY   Summary of chief complaints: (B) heel & plantar surface foot pain  History of current condition: Has been experiencing (B) heel and foot pain for several months now, no injury noted. Followed up with podiatrist and determined to have plantar fasciitis & achilles tendinitis. He works at a [employer] which entails being on his feet all day. He can stand for about 5 minutes before experiencing increased pain. If he walks it will feel fine for a bit but after about 45 minutes to an hour he will experience progressive increase in pain. His (L) heel and plantar surface experiences pain even if he is sitting. He denied having any increased pain with first steps in the morning however. He has hx of low back pain, but denies any paresthesia. No issues prior to this occurrence. He does have orthotics in his shoes which have helped to a certain extent   Pain level: current 0 /10, at best 0 /10, at worst 10 /10  Description of symptoms: Shooting sensation from the heel to the toes both feet   Occupation:    Leisure activities/Hobbies: Playing basketball, going for walks   Prior level of function: Previous to a few months ago, never had any issues  Current limitations: standing for more than 5-10 minutes, walking for more than 45 minutes to an hour, steps  Pt goals: Decrease pain, improve flexibility/mobility, strength, ability to stand and walk at work and with everyday activity  Past medical history was reviewed with Americo.    Imaging/Tests: see chart   Americo  has no past medical history on file.  He  has no past surgical history on  file.    ASSESSMENT  Americo presents to physical therapy evaluation with primary c/o (B) heel & plantar surface foot pain. The results of the objective tests and measures show decreased (B) ankle ROM, decreased LE flexibility, rearfoot valgus bilaterally (left more prominent than right), mild decrease in strength, gait impairment, balance deficit, mobility restrictions. Functional deficits include but are not limited to standing for more than 5-10 minutes, walking for more than 45 minutes to an hour, steps. Signs and symptoms are consistent with diagnosis of plantar fasciitis and potentially achilles tendinitis. Pt and PT discussed evaluation findings, pathology, POC and HEP.  Pt voiced understanding and performs HEP correctly without reported pain. Skilled Physical Therapy is medically necessary to address the above impairments and reach functional goals.    OBJECTIVE:   Musculoskeletal  Observation: Knee genu varum, (B) forefoot abduction, (B) rearfoot valgus (most prominent on left)    Palpation: Mild tenderness noted (B) medial calcaneal tubercle and medial longitudinal arch     Accessory Motion: Mild TC joint mobility restriction     DORIS ROM and Strength: (* denotes performed with pain)  Ankle/Foot   AROM MMT (-/5)    R L R L     PF 55 degrees 55 degrees 4-/5 4-/5     DF (L4) 8 degrees 10 degrees 5/5 5/5     Inversion 25 degrees 15 degrees 4/5 4/5     Eversion 10 degrees 10 degrees 5/5 5/5     Grt Toe Ext (L5)               Flexibility:  LE Flexibility R L     Hip Flexor         Hamstrings Mod to max tightness Mod to max tightness     ITB         Piriformis         Quads Mod tightness Mod tightness     Gastroc-soleus Mod to max tightness Mod to max tightness     Neurological:  Sensation: Intact     Balance and Functional Mobility:  Gait: pt ambulates on level ground with non-antalgic gait, pes planus and over pronation, abducted forefoot bilaterally     Today's Treatment and Response:   Pt education was provided  on exam findings, treatment diagnosis, treatment plan, expectations, and prognosis.  Today's Treatment       1/29/2025   Treatment   Therapeutic Exercise Standing gastroc stretch 30 sec x 3  Standing soleus stretch 30 sec x 3  H/L HS stretch 30 sec x 3  Seated PF ball rolls   Demonstration & return demonstration of exercises above    Therapeutic Exercise Min 15   Evaluation Min 30   Total of Timed Procedures 15   Total of Service Based 30   Total Treatment Time 45         Patient was instructed in and issued a HEP for: Access Code: [email or web address]  URL: [email or web address]  Date: 01/29/2025  Prepared by: Estuardo Mi    Exercises  - Gastroc Stretch on Wall  - 2 x daily - 7 x weekly - 3 sets - 30 seconds hold  - Soleus Stretch on Wall  - 2 x daily - 7 x weekly - 3 sets - 30 seconds hold  - Seated Plantar Fascia Mobilization with Small Ball  - 2 x daily - 7 x weekly - 1 sets - 2-3 min hold  - Supine Hamstring Stretch  - 2 x daily - 7 x weekly - 3 sets - 30 seconds hold    Charges:  PT EVAL: Moderate Complexity, TherEx 1  In agreement with evaluation findings and clinical rationale, this evaluation involved MODERATE COMPLEXITY decision making due to 1-2 personal factors/comorbidities, 3 or more body structures involved/activity limitations, and evolving symptoms as documented in the evaluation.                                                                                                                PLAN OF CARE:    Goals: (to be met in 5 visits)   Goals       Therapy Goals     Pt will report 50% reduction in symptoms rated at worst from 10/10 to 5/10  Pt will report being able to stand for 30 minutes or greater with less than 3/10 pain  Pt will report being able to walk for 45 minutes or greater with less than 3/10 pain  Pt will demonstrate AROM (B) ankle DF > 10 degrees in order for proper walking mechanics   Pt will demonstrate at 5/5 (B) hip/knee/ankle strength in order to maximize  effectiveness and efficiency with functional activities   Pt will verbalize understanding contributing factors regarding his symptoms including flexibility/mobility restrictions, biomechanical dysfunctions, strength deficits, movement patterns etc  Pt will perform HEP independently in order to sustain changes made with therapy sessions               Frequency / Duration: Patient will be seen 2x/week or a total of 5  visits over a 90 day period. Treatment will include: Gait training; Manual Therapy; Neuromuscular Re-education; Self-Care Home Management; Therapeutic Activities; Therapeutic Exercise; Home Exercise Program instruction    Education or treatment limitation: None   Rehab Potential: good     LEFS Score  LEFS Score: (Patient-Rptd) 90 % (1/29/2025  3:49 PM)    Patient/Family/Caregiver was advised of these findings, precautions, and treatment options and has agreed to actively participate in planning and for this course of care.    Thank you for your referral. Please co-sign or sign and return this letter via fax as soon as possible to [phone]. If you have any questions, please contact me at Dept: [phone]    Sincerely,  Electronically signed by therapist: Estuardo Mi, PT  Physician's certification required: Yes  I certify the need for these services furnished under this plan of treatment and while under my care.    X___________________________________________________ Date____________________    Certification From: 1/29/2025  To:4/29/2025

## 2025-02-03 ENCOUNTER — OFFICE VISIT (OUTPATIENT)
Facility: LOCATION | Age: 30
End: 2025-02-03
Attending: PODIATRIST
Payer: COMMERCIAL

## 2025-02-03 PROCEDURE — 97140 MANUAL THERAPY 1/> REGIONS: CPT

## 2025-02-03 PROCEDURE — 97110 THERAPEUTIC EXERCISES: CPT

## 2025-02-04 NOTE — PROGRESS NOTES
Patient: Americo Martinez (29 year old, male) Referring Provider:  Insurance:   Diagnosis: Heel pain, bilateral (M79.671,M79.672)  Foot pain, bilateral (M79.671,M79.672) Kathleen Snowden  [facility]   Date of Surgery: N/A Next MD visit:  N/A   Precautions:  None No data recorded Referral Information:    Date of Evaluation: Req: 5, Auth: 5, Exp: 4/30/2025 01/29/25 POC Auth Visits:  5       Today's Date   2/3/2025    Subjective  Pain at work today was slightly better today than usual. Has not done exercises consistently but has tried the stretches       Pain: 4/10     Objective  Mild tenderness (L) medial calcaneal tubercle       Assessment  Pes planus, increased foot/ankle pronation. Notable increased rearfoot valgus and forefoot abduction (L) side. Tolerated treatment session well with no increases in pain. Discussed importance of HEP consistency for optimal outcomes    Goals (to be met in 5 visits)   Goals       Therapy Goals     Pt will report 50% reduction in symptoms rated at worst from 10/10 to 5/10  Pt will report being able to stand for 30 minutes or greater with less than 3/10 pain  Pt will report being able to walk for 45 minutes or greater with less than 3/10 pain  Pt will demonstrate AROM (B) ankle DF > 10 degrees in order for proper walking mechanics   Pt will demonstrate at 5/5 (B) hip/knee/ankle strength in order to maximize effectiveness and efficiency with functional activities   Pt will verbalize understanding contributing factors regarding his symptoms including flexibility/mobility restrictions, biomechanical dysfunctions, strength deficits, movement patterns etc  Pt will perform HEP independently in order to sustain changes made with therapy sessions               Plan  Potential leuko tape to help support arch of foot    Treatment Last 4 Visits       1/29/2025 2/3/2025   Treatment   Treatment Day  2   Therapeutic Exercise Standing gastroc stretch 30 sec x 3  Standing soleus stretch 30 sec  x 3  H/L HS stretch 30 sec x 3  Seated PF ball rolls   Demonstration & return demonstration of exercises above  NuStep x 6 min   Seated PF lacrosse ball rolls x 2 min each  Seated arch lifts x 10 (5 second hold)   SB gastroc stretch 30 seconds x 3  SB soleus stretch 30 sec x 3  HS stretch on step 30 sec x 2 (B)  Standing heel raises (lacrosse ball wedged between heels) 2 x 10    Manual Therapy  STM/MFR (L) gastroc/soleus & PF    Therapeutic Exercise Min 15 25   Manual Therapy Min  15   Evaluation Min 30    Total of Timed Procedures 15 40   Total of Service Based 30 0   Total Treatment Time 45 40         HEP  Access Code: [email or web address]  URL: [email or web address]  Date: 01/29/2025  Prepared by: Estuardo Mi     Exercises  - Gastroc Stretch on Wall  - 2 x daily - 7 x weekly - 3 sets - 30 seconds hold  - Soleus Stretch on Wall  - 2 x daily - 7 x weekly - 3 sets - 30 seconds hold  - Seated Plantar Fascia Mobilization with Small Ball  - 2 x daily - 7 x weekly - 1 sets - 2-3 min hold  - Supine Hamstring Stretch  - 2 x daily - 7 x weekly - 3 sets - 30 seconds hold      Charges     TherEx 2, Manual 1

## 2025-02-05 ENCOUNTER — OFFICE VISIT (OUTPATIENT)
Facility: LOCATION | Age: 30
End: 2025-02-05
Attending: PODIATRIST
Payer: COMMERCIAL

## 2025-02-05 PROCEDURE — 97140 MANUAL THERAPY 1/> REGIONS: CPT

## 2025-02-05 PROCEDURE — 97110 THERAPEUTIC EXERCISES: CPT

## 2025-02-06 NOTE — PROGRESS NOTES
Patient: Americo Martinez (29 year old, male) Referring Provider:  Insurance:   Diagnosis: Heel pain, bilateral (M79.671,M79.672)  Foot pain, bilateral (M79.671,M79.672) [facility]   Date of Surgery: N/A Next MD visit:  N/A   Precautions:  None No data recorded Referral Information:    Date of Evaluation: Req: 5, Auth: 5, Exp: 4/30/2025 01/29/25 POC Auth Visits:  5       Today's Date   2/5/2025    Subjective  Has not had any (L) foot pain while at rest (which he usually does). Some pain at work today but not sharp. 3/10 at work       Pain: 0/10     Objective  Mild tenderness (L) medial calcaneal tubercle. (B) foot over pronation      Assessment  Pes planus & increased pronation bilaterally. Demonstrates tibialis posterior weakness bilaterally, left more weak than right. Tolerated exercises well with no reports of increased pain.    Goals (to be met in 5 visits)   Goals       Therapy Goals     Pt will report 50% reduction in symptoms rated at worst from 10/10 to 5/10  Pt will report being able to stand for 30 minutes or greater with less than 3/10 pain  Pt will report being able to walk for 45 minutes or greater with less than 3/10 pain  Pt will demonstrate AROM (B) ankle DF > 10 degrees in order for proper walking mechanics   Pt will demonstrate at 5/5 (B) hip/knee/ankle strength in order to maximize effectiveness and efficiency with functional activities   Pt will verbalize understanding contributing factors regarding his symptoms including flexibility/mobility restrictions, biomechanical dysfunctions, strength deficits, movement patterns etc  Pt will perform HEP independently in order to sustain changes made with therapy sessions               Plan  Continue soft tissue treatment as needed, address ankle stability, gentle stretching/flexibility exercises    Treatment Last 4 Visits       1/29/2025 2/3/2025 2/5/2025   PT Treatment   Treatment Day  2 3   Therapeutic Exercise Standing gastroc  stretch 30 sec x 3  Standing soleus stretch 30 sec x 3  H/L HS stretch 30 sec x 3  Seated PF ball rolls   Demonstration & return demonstration of exercises above  NuStep x 6 min   Seated PF lacrosse ball rolls x 2 min each  Seated arch lifts x 10 (5 second hold)   SB gastroc stretch 30 seconds x 3  SB soleus stretch 30 sec x 3  HS stretch on step 30 sec x 2 (B)  Standing heel raises (lacrosse ball wedged between heels) 2 x 10  Seated arch lifts x 20 (B)   Seated posterior tib resistance exercise x 10 (B)   SB calf stretch (gastroc) 30 sec x 3  SB calf stretch (soleus) 30 sec x 3  HS stretch on step 30 sec x 3 (B)  Standing heel raise (lacrosse ball between ankles) x 20   Side step w/GTB x 2 laps  HEP review, updated, handout provided        Manual Therapy  STM/MFR (L) gastroc/soleus & PF  STM/MFR (L) gastroc & soleus    Therapeutic Exercise Min 15 25 30   Manual Therapy Min  15 12   Evaluation Min 30     Total of Timed Procedures 15 40 42   Total of Service Based 30 0 0   Total Treatment Time 45 40 42         HEP    Access Code: [email or web address]  URL: [email or web address]  Date: 02/05/2025  Prepared by: Estuardo Mi    Exercises  - Gastroc Stretch on Wall  - 2 x daily - 7 x weekly - 3 sets - 30 seconds hold  - Soleus Stretch on Wall  - 2 x daily - 7 x weekly - 3 sets - 30 seconds hold  - Seated Plantar Fascia Mobilization with Small Ball  - 2 x daily - 7 x weekly - 1 sets - 2-3 min hold  - Supine Hamstring Stretch  - 2 x daily - 7 x weekly - 3 sets - 30 seconds hold  - Standing Hamstring Stretch with Step  - 2 x daily - 7 x weekly - 3 sets - 30 seconds hold  - Standing Heel Raise  - 2 x daily - 7 x weekly - 2 sets - 10 reps  - Seated Arch Lifts  - 2 x daily - 7 x weekly - 1 sets - 10 reps - 5 seconds hold  - Seated Figure 4 Ankle Inversion with Resistance  - 2 x daily - 7 x weekly - 2 sets - 10 reps      Charges     TherEx 2, Manual 1

## 2025-02-10 ENCOUNTER — OFFICE VISIT (OUTPATIENT)
Facility: LOCATION | Age: 30
End: 2025-02-10
Attending: PODIATRIST
Payer: COMMERCIAL

## 2025-02-10 PROCEDURE — 97110 THERAPEUTIC EXERCISES: CPT

## 2025-02-10 PROCEDURE — 97140 MANUAL THERAPY 1/> REGIONS: CPT

## 2025-02-11 NOTE — PROGRESS NOTES
Patient: Americo Martinez (29 year old, male) Referring Provider:  Insurance:   Diagnosis: Heel pain, bilateral (M79.671,M79.672)  Foot pain, bilateral (M79.671,M79.672) [facility]   Date of Surgery: N/A Next MD visit:  N/A   Precautions:  None No data recorded Referral Information:    Date of Evaluation: Req: 5, Auth: 5, Exp: 4/30/2025 01/29/25 POC Auth Visits:  5       Today's Date   2/10/2025    Subjective  Has not had any (L) foot pain while at rest (which he usually does). Some pain at work today but not sharp. 3/10 at work       Pain: 1/10     Objective  Mild tenderness noted (L) plantar fascia with some adhesions noted         Assessment  Tolerated session well without increases symptoms. Pes planus with increased foot pronation bilaterally. Adhesions noted (L) PF. Exercises have been beneficial with less pain at work overall    Goals (to be met in 5 visits)   Goals       Therapy Goals     Pt will report 50% reduction in symptoms rated at worst from 10/10 to 5/10  Pt will report being able to stand for 30 minutes or greater with less than 3/10 pain  Pt will report being able to walk for 45 minutes or greater with less than 3/10 pain  Pt will demonstrate AROM (B) ankle DF > 10 degrees in order for proper walking mechanics   Pt will demonstrate at 5/5 (B) hip/knee/ankle strength in order to maximize effectiveness and efficiency with functional activities   Pt will verbalize understanding contributing factors regarding his symptoms including flexibility/mobility restrictions, biomechanical dysfunctions, strength deficits, movement patterns etc  Pt will perform HEP independently in order to sustain changes made with therapy sessions               Plan  Continue soft tissue treatment as needed, address ankle stability, gentle stretching/flexibility exercises    Treatment Last 4 Visits       1/29/2025 2/3/2025 2/5/2025 2/10/2025   PT Treatment   Treatment Day  2 3 4   Therapeutic Exercise  Standing gastroc stretch 30 sec x 3  Standing soleus stretch 30 sec x 3  H/L HS stretch 30 sec x 3  Seated PF ball rolls   Demonstration & return demonstration of exercises above  NuStep x 6 min   Seated PF lacrosse ball rolls x 2 min each  Seated arch lifts x 10 (5 second hold)   SB gastroc stretch 30 seconds x 3  SB soleus stretch 30 sec x 3  HS stretch on step 30 sec x 2 (B)  Standing heel raises (lacrosse ball wedged between heels) 2 x 10  Seated arch lifts x 20 (B)   Seated posterior tib resistance exercise x 10 (B)   SB calf stretch (gastroc) 30 sec x 3  SB calf stretch (soleus) 30 sec x 3  HS stretch on step 30 sec x 3 (B)  Standing heel raise (lacrosse ball between ankles) x 20   Side step w/GTB x 2 laps  HEP review, updated, handout provided      Seated PF release with lacrosse ball 3 min each  Seated arch lift x 20 (B)   Standing gastroc stretch on SB 45 sec x 3  Standing soleus stretch on SB 30 sec x 2 (some mild pain upper calf)   HS stretch on step 30 sec x 3 (B)   Prostretch (gastroc) 30 sec x 2 (B)   Standing heel raise w/lacrosse ball between heels x 20    Manual Therapy  STM/MFR (L) gastroc/soleus & PF  STM/MFR (L) gastroc & soleus  STM plantar fascia, gastroc/soleus (L)      Therapeutic Exercise Min 15 25 30 25   Manual Therapy Min  15 12 15   Evaluation Min 30      Total of Timed Procedures 15 40 42 40   Total of Service Based 30 0 0 0   Total Treatment Time 45 40 42 40         HEP  Access Code: [email or web address]  URL: [email or web address]  Date: 02/05/2025  Prepared by: Estuardo Mi     Exercises  - Gastroc Stretch on Wall  - 2 x daily - 7 x weekly - 3 sets - 30 seconds hold  - Soleus Stretch on Wall  - 2 x daily - 7 x weekly - 3 sets - 30 seconds hold  - Seated Plantar Fascia Mobilization with Small Ball  - 2 x daily - 7 x weekly - 1 sets - 2-3 min hold  - Supine Hamstring Stretch  - 2 x daily - 7 x weekly - 3 sets - 30 seconds hold  - Standing Hamstring Stretch with Step  - 2 x  daily - 7 x weekly - 3 sets - 30 seconds hold  - Standing Heel Raise  - 2 x daily - 7 x weekly - 2 sets - 10 reps  - Seated Arch Lifts  - 2 x daily - 7 x weekly - 1 sets - 10 reps - 5 seconds hold  - Seated Figure 4 Ankle Inversion with Resistance  - 2 x daily - 7 x weekly - 2 sets - 10 reps      Charges     TherEx 2, Manual 1

## 2025-02-12 ENCOUNTER — APPOINTMENT (OUTPATIENT)
Facility: LOCATION | Age: 30
End: 2025-02-12
Attending: PODIATRIST
Payer: COMMERCIAL

## 2025-02-17 ENCOUNTER — OFFICE VISIT (OUTPATIENT)
Facility: LOCATION | Age: 30
End: 2025-02-17
Attending: PODIATRIST
Payer: COMMERCIAL

## 2025-02-17 PROCEDURE — 97140 MANUAL THERAPY 1/> REGIONS: CPT

## 2025-02-17 PROCEDURE — 97110 THERAPEUTIC EXERCISES: CPT

## 2025-02-17 NOTE — PROGRESS NOTES
Patient: Americo Martinez (30 year old, male) Referring Provider:  Insurance:   Diagnosis: Heel pain, bilateral (M79.671,M79.672)  Foot pain, bilateral (M79.671,M79.672) [facility]  [facility]   Date of Surgery: N/A Next MD visit:  N/A   Precautions:  None No data recorded Referral Information:    Date of Evaluation: Req: 5, Auth: 5, Exp: 4/30/2025 01/29/25 POC Auth Visits:  5          Progress Summary  Pt has attended 5 visits in Physical Therapy.      Today's Date   2/17/2025    Subjective  He got some new shoes which seemed to have helped. Overall his symptoms are improving       Pain: 3/10     Objective  Mild tenderness and nodule noted (L) proximal PF   AROM (B) DF 10 degrees   Less palpable restrictions through the (B) PF & gastroc-soleus musculature   (B) hip strength grossly 5/5  (B) PF strength 4/5 (no reported pain)  (B) moderate HS tightness, (B) moderate gastroc-soleus tightness   (B) pes planus & increased foot pronation - no pain with ambulation during session (support orthotics and footwear which have helped)      Assessment  Tolerated treatment session well with no reports of any pain. Pt demonstrating improvement in LE flexibility/mobility. (B) pes planus & increased ankle/foot pronation which is more pronounced on the (L) side. Exercises, orthotics, and new shoes have helped reduce his symptoms. Pt has residual impairments that I feel skilled PT is still needed.    Goals (to be met in 5 visits)   Goals       Therapy Goals     Pt will report 50% reduction in symptoms rated at worst from 10/10 to 5/10  Pt will report being able to stand for 30 minutes or greater with less than 3/10 pain  Pt will report being able to walk for 45 minutes or greater with less than 3/10 pain  Pt will demonstrate AROM (B) ankle DF > 10 degrees in order for proper walking mechanics   Pt will demonstrate at 5/5 (B) hip/knee/ankle strength in order to maximize effectiveness and efficiency with functional  activities   Pt will verbalize understanding contributing factors regarding his symptoms including flexibility/mobility restrictions, biomechanical dysfunctions, strength deficits, movement patterns etc  Pt will perform HEP independently in order to sustain changes made with therapy sessions               Plan  Pt to attend PT 2x/week for additoinal 5 visits (total 10)    Treatment Last 4 Visits       2/3/2025 2/5/2025 2/10/2025 2/17/2025   PT Treatment   Treatment Day 2 3 4 5   Therapeutic Exercise NuStep x 6 min   Seated PF lacrosse ball rolls x 2 min each  Seated arch lifts x 10 (5 second hold)   SB gastroc stretch 30 seconds x 3  SB soleus stretch 30 sec x 3  HS stretch on step 30 sec x 2 (B)  Standing heel raises (lacrosse ball wedged between heels) 2 x 10  Seated arch lifts x 20 (B)   Seated posterior tib resistance exercise x 10 (B)   SB calf stretch (gastroc) 30 sec x 3  SB calf stretch (soleus) 30 sec x 3  HS stretch on step 30 sec x 3 (B)  Standing heel raise (lacrosse ball between ankles) x 20   Side step w/GTB x 2 laps  HEP review, updated, handout provided      Seated PF release with lacrosse ball 3 min each  Seated arch lift x 20 (B)   Standing gastroc stretch on SB 45 sec x 3  Standing soleus stretch on SB 30 sec x 2 (some mild pain upper calf)   HS stretch on step 30 sec x 3 (B)   Prostretch (gastroc) 30 sec x 2 (B)   Standing heel raise w/lacrosse ball between heels x 20  Seated PF ball rolls x 2 min (B)  Seated PF stretch x 10 (10 seconds) (B)  SB gastroc stretch 45 sec x 3   HS stretch on step 30 sec x 3 (B)   Wall soleus stretch 30 sec x 2 (B)   Heel raises w/lacross ball wedged between heels x 20   Standing arch lifts x 20 each   HEP review    Manual Therapy STM/MFR (L) gastroc/soleus & PF  STM/MFR (L) gastroc & soleus  STM plantar fascia, gastroc/soleus (L)    IASTM to (L) PF  STM/MFR (L) gastroc & soleus    Therapeutic Exercise Min 25 30 25 25   Manual Therapy Min 15 12 15 17   Total of Timed  Procedures 40 42 40 42   Total of Service Based 0 0 0 0   Total Treatment Time 40 42 40 42         HEP  Access Code: [email or web address]  URL: [email or web address]  Date: 02/05/2025  Prepared by: Estuardo Mi     Exercises  - Gastroc Stretch on Wall  - 2 x daily - 7 x weekly - 3 sets - 30 seconds hold  - Soleus Stretch on Wall  - 2 x daily - 7 x weekly - 3 sets - 30 seconds hold  - Seated Plantar Fascia Mobilization with Small Ball  - 2 x daily - 7 x weekly - 1 sets - 2-3 min hold  - Supine Hamstring Stretch  - 2 x daily - 7 x weekly - 3 sets - 30 seconds hold  - Standing Hamstring Stretch with Step  - 2 x daily - 7 x weekly - 3 sets - 30 seconds hold  - Standing Heel Raise  - 2 x daily - 7 x weekly - 2 sets - 10 reps  - Seated Arch Lifts  - 2 x daily - 7 x weekly - 1 sets - 10 reps - 5 seconds hold  - Seated Figure 4 Ankle Inversion with Resistance  - 2 x daily - 7 x weekly - 2 sets - 10 reps      Charges     TherEx 2, Manual 1

## 2025-03-06 ENCOUNTER — OFFICE VISIT (OUTPATIENT)
Facility: LOCATION | Age: 30
End: 2025-03-06
Attending: PODIATRIST
Payer: COMMERCIAL

## 2025-03-06 PROCEDURE — 97140 MANUAL THERAPY 1/> REGIONS: CPT

## 2025-03-06 PROCEDURE — 97110 THERAPEUTIC EXERCISES: CPT

## 2025-03-06 NOTE — PROGRESS NOTES
Patient: Americo Martinez (30 year old, male) Referring Provider:  Insurance:   Diagnosis: Heel pain, bilateral (M79.671,M79.672)  Foot pain, bilateral (M79.671,M79.672) [facility]  [facility]   Date of Surgery: N/A Next MD visit:  N/A   Precautions:  None No data recorded Referral Information:    Date of Evaluation: Req: 10, Auth: 10, Exp: 4/30/2025 01/29/25 POC Auth Visits:  10       Today's Date   3/6/2025    Subjective  Standing longer periods of time before pain starts to occur. During work days he has been fine until the end of the day and he will feel some soreness. No longer sharp pains on the top of left foot.       Pain: 0/10     Objective  Mild to moderate adhesions/soft tissue restrictions (L) PF       Assessment  Tolerated treatment session well with no reports of any pain. Demonstrates adhesions/soft tissue restrictions noted (L) plantar fascia. This has been improving with treatment sessions. Increased pronation & rearfoot valgus bilaterally (left more pronounced than right)    Goals (to be met in 10 visits)   Pt will report 50% reduction in symptoms rated at worst from 10/10 to 5/10  Pt will report being able to stand for 30 minutes or greater with less than 3/10 pain  Pt will report being able to walk for 45 minutes or greater with less than 3/10 pain  Pt will demonstrate AROM (B) ankle DF > 10 degrees in order for proper walking mechanics   Pt will demonstrate at 5/5 (B) hip/knee/ankle strength in order to maximize effectiveness and efficiency with functional activities   Pt will verbalize understanding contributing factors regarding his symptoms including flexibility/mobility restrictions, biomechanical dysfunctions, strength deficits, movement patterns etc  Pt will perform HEP independently in order to sustain changes made with therapy sessions      Plan  Continue LE flexibility & strength exercises. Re-asses plantar fascia mobility    Treatment Last 4 Visits       2/5/2025 2/10/2025  2/17/2025 3/6/2025   PT Treatment   Treatment Day 3 4 5 6   Therapeutic Exercise Seated arch lifts x 20 (B)   Seated posterior tib resistance exercise x 10 (B)   SB calf stretch (gastroc) 30 sec x 3  SB calf stretch (soleus) 30 sec x 3  HS stretch on step 30 sec x 3 (B)  Standing heel raise (lacrosse ball between ankles) x 20   Side step w/GTB x 2 laps  HEP review, updated, handout provided      Seated PF release with lacrosse ball 3 min each  Seated arch lift x 20 (B)   Standing gastroc stretch on SB 45 sec x 3  Standing soleus stretch on SB 30 sec x 2 (some mild pain upper calf)   HS stretch on step 30 sec x 3 (B)   Prostretch (gastroc) 30 sec x 2 (B)   Standing heel raise w/lacrosse ball between heels x 20  Seated PF ball rolls x 2 min (B)  Seated PF stretch x 10 (10 seconds) (B)  SB gastroc stretch 45 sec x 3   HS stretch on step 30 sec x 3 (B)   Wall soleus stretch 30 sec x 2 (B)   Heel raises w/lacross ball wedged between heels x 20   Standing arch lifts x 20 each   HEP review     Manual Therapy STM/MFR (L) gastroc & soleus  STM plantar fascia, gastroc/soleus (L)    IASTM to (L) PF  STM/MFR (L) gastroc & soleus     Therapeutic Exercise Min 30 25 25    Manual Therapy Min 12 15 17    Total of Timed Procedures 42 40 42    Total of Service Based 0 0 0    Total Treatment Time 42 40 42           2/5/2025 2/10/2025 2/17/2025 3/6/2025   LE Treatment   Treatment Day 3 4 5 6   Therapeutic Exercise    NuStep x 6 min (L6)  (L) PF stretch x 10 (10 second hold)  (B) PF self release with ball rolls x 2 min   Seated arch lifts x 20 each  SB calf stretch 30 sec x 3   Prostretch 30 sec x 3  HS stretch 30 sec x 3  Standing heel raise (lacrosse ball between heels)       Manual Therapy    STM/IASTM to (L) PF   STM (L) PF (MWM)   Therapeutic Exercise Minutes    30   Manual Therapy Minutes    15   Total Time Of Timed Procedures    45   Total Time Of Service-Based Procedures    0   Total Treatment Time    45        HEP   Access Code:  [email or web address]  URL: [email or web address]  Date: 02/05/2025  Prepared by: Estuardo Mi     Exercises  - Gastroc Stretch on Wall  - 2 x daily - 7 x weekly - 3 sets - 30 seconds hold  - Soleus Stretch on Wall  - 2 x daily - 7 x weekly - 3 sets - 30 seconds hold  - Seated Plantar Fascia Mobilization with Small Ball  - 2 x daily - 7 x weekly - 1 sets - 2-3 min hold  - Supine Hamstring Stretch  - 2 x daily - 7 x weekly - 3 sets - 30 seconds hold  - Standing Hamstring Stretch with Step  - 2 x daily - 7 x weekly - 3 sets - 30 seconds hold  - Standing Heel Raise  - 2 x daily - 7 x weekly - 2 sets - 10 reps  - Seated Arch Lifts  - 2 x daily - 7 x weekly - 1 sets - 10 reps - 5 seconds hold  - Seated Figure 4 Ankle Inversion with Resistance  - 2 x daily - 7 x weekly - 2 sets - 10 reps       Charges     TherEx 2, Manual 1                       Face Mask

## 2025-03-10 ENCOUNTER — OFFICE VISIT (OUTPATIENT)
Facility: LOCATION | Age: 30
End: 2025-03-10
Attending: PODIATRIST
Payer: COMMERCIAL

## 2025-03-10 PROCEDURE — 97140 MANUAL THERAPY 1/> REGIONS: CPT

## 2025-03-10 PROCEDURE — 97110 THERAPEUTIC EXERCISES: CPT

## 2025-03-10 NOTE — PROGRESS NOTES
Patient: Americo Martinez (30 year old, male) Referring Provider:  Insurance:   Diagnosis: Heel pain, bilateral (M79.671,M79.672)  Foot pain, bilateral (M79.671,M79.672) [facility]   Date of Surgery: N/A Next MD visit:  N/A   Precautions:  None No data recorded Referral Information:    Date of Evaluation: Req: 10, Auth: 10, Exp: 4/30/2025 01/29/25 POC Auth Visits:  10       Today's Date   3/10/2025    Subjective  Can last most of work day before having any pain in the feet. Symptoms have improved overall since starting therapy sessions       Pain:       Objective  See flowsheet         Assessment  Attempted posterior tib strengthening with band but had some mild discomfort. Calf tightness bilaterally along with PF restrictions. Flexibility has improved overall since starting therapy sessions. Tolerated all other exercises without pain    Goals (to be met in 10 visits)   Pt will report 50% reduction in symptoms rated at worst from 10/10 to 5/10  Pt will report being able to stand for 30 minutes or greater with less than 3/10 pain  Pt will report being able to walk for 45 minutes or greater with less than 3/10 pain  Pt will demonstrate AROM (B) ankle DF > 10 degrees in order for proper walking mechanics   Pt will demonstrate at 5/5 (B) hip/knee/ankle strength in order to maximize effectiveness and efficiency with functional activities   Pt will verbalize understanding contributing factors regarding his symptoms including flexibility/mobility restrictions, biomechanical dysfunctions, strength deficits, movement patterns etc  Pt will perform HEP independently in order to sustain changes made with therapy sessions          Plan  Re-assess calf flexibility, PF restrictions    Treatment Last 4 Visits       2/10/2025 2/17/2025 3/6/2025 3/10/2025   PT Treatment   Treatment Day 4 5 6 7   Therapeutic Exercise Seated PF release with lacrosse ball 3 min each  Seated arch lift x 20 (B)   Standing gastroc  stretch on SB 45 sec x 3  Standing soleus stretch on SB 30 sec x 2 (some mild pain upper calf)   HS stretch on step 30 sec x 3 (B)   Prostretch (gastroc) 30 sec x 2 (B)   Standing heel raise w/lacrosse ball between heels x 20  Seated PF ball rolls x 2 min (B)  Seated PF stretch x 10 (10 seconds) (B)  SB gastroc stretch 45 sec x 3   HS stretch on step 30 sec x 3 (B)   Wall soleus stretch 30 sec x 2 (B)   Heel raises w/lacross ball wedged between heels x 20   Standing arch lifts x 20 each   HEP review      Manual Therapy STM plantar fascia, gastroc/soleus (L)    IASTM to (L) PF  STM/MFR (L) gastroc & soleus      Therapeutic Exercise Min 25 25     Manual Therapy Min 15 17     Total of Timed Procedures 40 42     Total of Service Based 0 0     Total Treatment Time 40 42            2/10/2025 2/17/2025 3/6/2025 3/10/2025   LE Treatment   Treatment Day 4 5 6 7   Therapeutic Exercise   NuStep x 6 min (L6)  (L) PF stretch x 10 (10 second hold)  (B) PF self release with ball rolls x 2 min   Seated arch lifts x 20 each  SB calf stretch 30 sec x 3   Prostretch 30 sec x 3  HS stretch 30 sec x 3  Standing heel raise (lacrosse ball between heels)     Seated arch lifts x 20 each  Seated PF stretch x 10 (10 second hold)   Attempted posterior tib strengthening with resistance band x 10 each  PF ball rolls x 2 min each   Prostretch (gastroc) 30 sec x 3  Soleus stretch on wall 30 sec x 2  Standing heel raise (ball wedged between heels)  Side step BTB x 2 laps    Manual Therapy   STM/IASTM to (L) PF   STM (L) PF (MWM) STM/MFR (L) calf musculature    Therapeutic Exercise Minutes   30 25   Manual Therapy Minutes   15 17   Total Time Of Timed Procedures   45 42   Total Time Of Service-Based Procedures   0 0   Total Treatment Time   45 42        HEP    Access Code: [email or web address]  URL: [email or web address]  Date: 02/05/2025  Prepared by: Estuardo Mi     Exercises  - Gastroc Stretch on Wall  - 2 x daily - 7 x weekly - 3  sets - 30 seconds hold  - Soleus Stretch on Wall  - 2 x daily - 7 x weekly - 3 sets - 30 seconds hold  - Seated Plantar Fascia Mobilization with Small Ball  - 2 x daily - 7 x weekly - 1 sets - 2-3 min hold  - Supine Hamstring Stretch  - 2 x daily - 7 x weekly - 3 sets - 30 seconds hold  - Standing Hamstring Stretch with Step  - 2 x daily - 7 x weekly - 3 sets - 30 seconds hold  - Standing Heel Raise  - 2 x daily - 7 x weekly - 2 sets - 10 reps  - Seated Arch Lifts  - 2 x daily - 7 x weekly - 1 sets - 10 reps - 5 seconds hold  - Seated Figure 4 Ankle Inversion with Resistance  - 2 x daily - 7 x weekly - 2 sets - 10 reps       Charges     TherEx 2, Manual 1

## 2025-06-05 ENCOUNTER — OFFICE VISIT (OUTPATIENT)
Dept: FAMILY MEDICINE CLINIC | Facility: CLINIC | Age: 30
End: 2025-06-05
Payer: COMMERCIAL

## 2025-06-05 VITALS
DIASTOLIC BLOOD PRESSURE: 76 MMHG | BODY MASS INDEX: 29 KG/M2 | OXYGEN SATURATION: 98 % | WEIGHT: 215 LBS | TEMPERATURE: 97 F | HEART RATE: 78 BPM | SYSTOLIC BLOOD PRESSURE: 124 MMHG

## 2025-06-05 DIAGNOSIS — M54.50 ACUTE BILATERAL LOW BACK PAIN WITHOUT SCIATICA: Primary | ICD-10-CM

## 2025-06-05 PROCEDURE — 3078F DIAST BP <80 MM HG: CPT | Performed by: FAMILY MEDICINE

## 2025-06-05 PROCEDURE — 99213 OFFICE O/P EST LOW 20 MIN: CPT | Performed by: FAMILY MEDICINE

## 2025-06-05 PROCEDURE — 3074F SYST BP LT 130 MM HG: CPT | Performed by: FAMILY MEDICINE

## 2025-06-05 RX ORDER — NAPROXEN 500 MG/1
500 TABLET ORAL 2 TIMES DAILY WITH MEALS
Qty: 20 TABLET | Refills: 0 | Status: SHIPPED | OUTPATIENT
Start: 2025-06-05

## 2025-06-05 NOTE — PATIENT INSTRUCTIONS
Patient Instructions:     Please use heating pads 3-4 per day for 15 min at a time, hot showers or hot baths to help muscles relax.   Naproxen 10 day course sent to pharmacy.   Low back stretches - see attachment.     It was a pleasure meeting you today.  Please have labs completed as discussed in a timely manner.  Please take all medications as prescribed and discussed today.  Please have any imaging completed if ordered in time span discussed.    Please return to clinic next Monday (schedule visit with Maria E NP) for repeat evaluation and return to work.     Dr. Cyr

## 2025-06-05 NOTE — PROGRESS NOTES
Subjective:   Americo Martinez is a 30 year old male who presents for Back Pain (Since Sunday, after mowing lawn noticing lower back pain, has been trying ice and tylenol)       History/Other:   History of Present Illness  Americo Martinez is a 30 year old male who presents with acute low back pain after mowing the lawn.    He has been experiencing acute low back pain since Sunday after mowing the lawn. The pain is described as a sharp ache across the lower back with occasional shooting pain up the left side, particularly when turning or getting out of the car. No pain radiating down the legs. No changes in gait/falls.     The pain was most severe on Monday and Tuesday, rated as an 8 or 9 out of 10 with movement, but has slightly improved since then. It is alleviated by rest, sitting, and lying down, but exacerbated by movement, especially twisting and prolonged walking or standing. He has been using ice and Tylenol, which provide minimal relief.    No numbness, tingling, or weakness in the legs. No urinary or bowel incontinence, and no abdominal pain. He has no history of significant back injuries, although he has experienced similar pain in the past.    He works in a warehouse (bending and lifting orders) and is unable to perform his duties due to the pain, which affects his ability to lift and bend.     He typically engages in walking for exercise.     Chief Complaint Reviewed and Verified  Nursing Notes Reviewed and   Verified  Tobacco Reviewed  Allergies Reviewed  Medications Reviewed    Problem List Reviewed  Medical History Reviewed  Surgical History   Reviewed  Family History Reviewed             Review of Systems:  See HPI    Objective:   /76   Pulse 78   Temp 97.3 °F (36.3 °C)   Wt 215 lb (97.5 kg)   SpO2 98%   BMI 29.16 kg/m²  Estimated body mass index is 29.16 kg/m² as calculated from the following:    Height as of 2/10/24: 6' (1.829 m).    Weight as of this encounter: 215 lb (97.5  kg).  Results  DIAGNOSTIC  Straight leg raise: Positive on right side (06/05/2025)     Physical Exam  VITALS: Reviewed, weight and BMI reviewed.  GENERAL: Healthy appearing, well-developed, no acute distress.  PSYCHIATRIC: Normal mood, behavior and affect.  HEAD: Normocephalic and atraumatic.  CARDIOVASCULAR: Regular rate and rhythm, no murmurs.  LUNGS: Clear to auscultation bilaterally, no wheezes rales or crackles, no increased work of breathing, no conversational dyspnea.  ABDOMEN: Soft, nontender and nondistended  SKIN: Warm, well perfused, no skin rashes or abnormal lesions.  MUSCULOSKELETAL: No deformities or swelling, normal gait. Mild tenderness & mild hypertonicity over left lower lumbar paraspinals, no erythema, warmth, or masses over lumbar paraspinals. 5/5 strength in lower extremities. Positive straight leg raise test on right side. No spine midline ttp.   NEUROLOGICAL: Alert and oriented times 3, ambulating with no limitations, normal muscle strength and tone, no focal deficits.      Assessment & Plan:   1. Acute bilateral low back pain without sciatica (Primary)  -     Naproxen; Take 1 tablet (500 mg total) by mouth 2 (two) times daily with meals.  Dispense: 20 tablet; Refill: 0    Assessment & Plan  Low Back Pain  Acute low back pain likely due to muscle strain or sprain. No signs of fracture or spinal involvement. No red flag signs present at this time.  Musculoskeletal in nature, possibly from unaccustomed activity.   - Prescribed naproxen for 10 days, twice daily with food.  - Advised heat therapy 3-4 times daily for 15-20 minutes.  - Low back stretches advised and given in AVS   - Provided work excuse for lifting and bending from 6/5/25-6/9/25   - Scheduled follow-up for Monday for reassessment and return to work letter     Instructions provided as documented in the AVS.    The patient indicated understanding of the diagnosis and agreed with the plan of care.    Red flag s/s reviewed and  discussed for conditions listed including concerns for prompt ED evaluation  Medical compliance with plan discussed and risks of non-compliance reviewed  Education completed on disease process, etiology & prognosis  Proper usage and side effects of medications reviewed & discussed    Return to clinic as clinically indicated or as discussed    Marianne Cyr DO, 6/5/2025, 11:39 AM

## 2025-06-05 NOTE — PROGRESS NOTES
The following individual(s) verbally consented to be recorded using ambient AI listening technology and understand that they can each withdraw their consent to this listening technology at any point by asking the clinician to turn off or pause the recording:    Patient name: Americo Martinez

## 2025-06-09 ENCOUNTER — HOSPITAL ENCOUNTER (OUTPATIENT)
Dept: GENERAL RADIOLOGY | Age: 30
Discharge: HOME | End: 2025-06-09
Attending: NURSE PRACTITIONER
Payer: COMMERCIAL

## 2025-06-09 ENCOUNTER — TELEPHONE (OUTPATIENT)
Dept: FAMILY MEDICINE CLINIC | Facility: CLINIC | Age: 30
End: 2025-06-09

## 2025-06-09 ENCOUNTER — PATIENT MESSAGE (OUTPATIENT)
Dept: FAMILY MEDICINE CLINIC | Facility: CLINIC | Age: 30
End: 2025-06-09

## 2025-06-09 ENCOUNTER — OFFICE VISIT (OUTPATIENT)
Dept: FAMILY MEDICINE CLINIC | Facility: CLINIC | Age: 30
End: 2025-06-09
Payer: COMMERCIAL

## 2025-06-09 VITALS
OXYGEN SATURATION: 99 % | BODY MASS INDEX: 29.39 KG/M2 | WEIGHT: 217 LBS | SYSTOLIC BLOOD PRESSURE: 114 MMHG | TEMPERATURE: 97 F | HEART RATE: 76 BPM | HEIGHT: 72 IN | DIASTOLIC BLOOD PRESSURE: 68 MMHG

## 2025-06-09 DIAGNOSIS — M54.50 ACUTE BILATERAL LOW BACK PAIN WITHOUT SCIATICA: Primary | ICD-10-CM

## 2025-06-09 DIAGNOSIS — M54.50 ACUTE BILATERAL LOW BACK PAIN WITHOUT SCIATICA: ICD-10-CM

## 2025-06-09 PROBLEM — F19.90 POLYSUBSTANCE USE DISORDER: Status: RESOLVED | Noted: 2024-02-03 | Resolved: 2025-06-09

## 2025-06-09 PROBLEM — F19.20 POLYSUBSTANCE DEPENDENCE (HCC): Status: RESOLVED | Noted: 2024-02-03 | Resolved: 2025-06-09

## 2025-06-09 PROCEDURE — 72110 X-RAY EXAM L-2 SPINE 4/>VWS: CPT | Performed by: NURSE PRACTITIONER

## 2025-06-09 PROCEDURE — 99213 OFFICE O/P EST LOW 20 MIN: CPT | Performed by: NURSE PRACTITIONER

## 2025-06-09 PROCEDURE — 3074F SYST BP LT 130 MM HG: CPT | Performed by: NURSE PRACTITIONER

## 2025-06-09 PROCEDURE — 3078F DIAST BP <80 MM HG: CPT | Performed by: NURSE PRACTITIONER

## 2025-06-09 PROCEDURE — 3008F BODY MASS INDEX DOCD: CPT | Performed by: NURSE PRACTITIONER

## 2025-06-09 NOTE — PROGRESS NOTES
HPI:     Patient is here for follow up for acute lower back pain. Pain began 6/1/2025 while mowing the lawn. Not work related. Saw Dr. Cyr on 6/5/25    Now rating the pain 3/2025. Pain was constant but now comes and goes. Currently having discomfort with forward bending, twisting or prolonged sitting. Feels he could return to work with restrictions. No bowel and bladder incontinence. Taking as needed Ibuprofen for discomfort.     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 He would be able to work with light duty.     Current Medications[1]   Past Medical History[2]   Past Surgical History[3]   Family History[4]   Short Social Hx on File[5]      REVIEW OF SYSTEMS:   Review of Systems   Constitutional:  Negative for fatigue.   Genitourinary:  Negative for difficulty urinating.   Musculoskeletal:  Positive for back pain. Negative for gait problem, neck pain and neck stiffness.       EXAM:   /68   Pulse 76   Temp 97.3 °F (36.3 °C)   Ht 6' (1.829 m)   Wt 217 lb (98.4 kg)   SpO2 99%   BMI 29.43 kg/m²   Physical Exam  Constitutional:       General: He is not in acute distress.     Appearance: He is overweight.   Musculoskeletal:      Lumbar back: No spasms. Decreased range of motion (painful forward flexion, right lateral extension and right rotation). Positive right straight leg raise test (mildly painful with testing). Negative left straight leg raise test.   Neurological:      Mental Status: He is alert.         ASSESSMENT AND PLAN:   Diagnoses and all orders for this visit:    Acute bilateral low back pain without sciatica  -     XR LUMBAR SPINE (MIN 4 VIEWS) (CPT=72110); Future    Check imaging  Note given for work restrictions  If pain persisting, will plan spine institute referral  Call or message with update in 1 week     Note to patient: The 21st Century Cures Act makes medical notes like these available to patients in the interest of transparency. However, be advised this is a  medical document. It is intended as peer to peer communication. It is written in medical language and may contain abbreviations or verbiage that are unfamiliar. It may appear blunt or direct. Medical documents are intended to carry relevant information, facts as evident, and the clinical opinion of the practitioner.             [1]   Current Outpatient Medications   Medication Sig Dispense Refill    buPROPion ER (WELLBUTRIN XL) 300 MG Oral Tablet 24 Hr Take 1 tablet (300 mg total) by mouth every morning. 90 tablet 0    escitalopram (LEXAPRO) 10 MG Oral Tab Take 1 tablet (10 mg total) by mouth daily. 90 tablet 0    escitalopram (LEXAPRO) 20 MG Oral Tab Take 1 tablet (20 mg total) by mouth daily. 90 tablet 0    methylphenidate (RITALIN) 10 MG Oral Tab Take 0.5-1 tablets (5-10 mg total) by mouth daily as needed (ADHD Booster). 30 tablet 0    methylphenidate ER (CONCERTA) 36 MG Oral Tab CR Take 1 tablet (36 mg total) by mouth every morning. 30 tablet 0    naproxen 500 MG Oral Tab Take 1 tablet (500 mg total) by mouth 2 (two) times daily with meals. (Patient not taking: Reported on 6/9/2025) 20 tablet 0   [2]   Past Medical History:   Polysubstance use disorder   [3] History reviewed. No pertinent surgical history.  [4]   Family History  Problem Relation Age of Onset    Anxiety Mother         Suspected   [5]   Social History  Socioeconomic History    Marital status: Single   Tobacco Use    Smoking status: Never    Smokeless tobacco: Never   Vaping Use    Vaping status: Never Used   Substance and Sexual Activity    Alcohol use: Yes     Alcohol/week: 2.0 - 3.0 standard drinks of alcohol     Types: 2 - 3 Cans of beer per week     Comment: few times a week    Drug use: Yes     Types: Cannabis, LSD, \"Crack\" cocaine, Opiods     Comment: currently sober from all substances

## 2025-06-09 NOTE — TELEPHONE ENCOUNTER
----- Message from Celina Menard sent at 6/9/2025  2:16 PM CDT -----  Results reviewed.   There is evidence of mild lumbar degenerative changes and there is straightening secondary to mild spasm. Would continue use heat but if pain persists, would recommend physical therapy   ----- Message -----  From: [facility]  Sent: 6/9/2025   1:47 PM CDT  To: Celina Menard, DIANDRA

## 2025-06-10 NOTE — TELEPHONE ENCOUNTER
6/9/2025  2:39 PM ROMY Concepcion RN Patient Medical Advice Request  test results     Patient viewed [employer] message

## 2025-06-16 ENCOUNTER — TELEPHONE (OUTPATIENT)
Dept: FAMILY MEDICINE CLINIC | Facility: CLINIC | Age: 30
End: 2025-06-16

## 2025-06-16 DIAGNOSIS — M54.50 ACUTE BILATERAL LOW BACK PAIN WITHOUT SCIATICA: Primary | ICD-10-CM

## 2025-06-16 NOTE — TELEPHONE ENCOUNTER
Patient needs to be seen for evaluation  I also placed referral for spine institute, which we discussed at his follow up visit last week.

## 2025-06-16 NOTE — TELEPHONE ENCOUNTER
Patient came to the office, states he is still having back pain, he would like Dr Cyr to extend his time to stay on modified duty for a few more days.  Patient states he needs this for his job tomorrow.  He goes to work @ 6am.  Can another provider amend the note?

## 2025-06-16 NOTE — TELEPHONE ENCOUNTER
Future Appointments   Date Time Provider Department Center   6/17/2025  4:20 PM Marianne Cyr DO [facility]   8/13/2025  4:30 PM Jaylin Phillips APRN [facility]     Patient scheduled tomorrow with Dr Cyr

## 2025-06-17 ENCOUNTER — OFFICE VISIT (OUTPATIENT)
Dept: FAMILY MEDICINE CLINIC | Facility: CLINIC | Age: 30
End: 2025-06-17
Payer: COMMERCIAL

## 2025-06-17 ENCOUNTER — SPINE CENTER NAVIGATION (OUTPATIENT)
Age: 30
End: 2025-06-17

## 2025-06-17 VITALS
SYSTOLIC BLOOD PRESSURE: 120 MMHG | OXYGEN SATURATION: 96 % | TEMPERATURE: 97 F | DIASTOLIC BLOOD PRESSURE: 82 MMHG | HEART RATE: 85 BPM

## 2025-06-17 DIAGNOSIS — M54.50 ACUTE BILATERAL LOW BACK PAIN WITHOUT SCIATICA: Primary | ICD-10-CM

## 2025-06-17 PROCEDURE — 3079F DIAST BP 80-89 MM HG: CPT | Performed by: FAMILY MEDICINE

## 2025-06-17 PROCEDURE — 3074F SYST BP LT 130 MM HG: CPT | Performed by: FAMILY MEDICINE

## 2025-06-17 PROCEDURE — 99214 OFFICE O/P EST MOD 30 MIN: CPT | Performed by: FAMILY MEDICINE

## 2025-06-17 NOTE — PROGRESS NOTES
Spine Center Referral Navigation Encounter Note    Referred by: DIANDRA Guerrero    Imaging: XR Lumbar Spine 6/9/25  If imaging done at an external facility, instructed patient to bring disc of MRI to appointment.     Previously Seen Spine Care Providers: None    6/17- Lvm for patient requesting call back to discuss.    6/19- Lvm for patient requesting call back to discuss.    Attempted to contact patient multiple times without response.  No additional outreach completed at this time.  Please call at [phone], option #4, to schedule an appointment with a spine specialist if you need further assistance.

## 2025-06-17 NOTE — PATIENT INSTRUCTIONS
Patient Instructions:    Please schedule with spine specialist:     Ari Driscoll,      You have been referred to the Spine Center. We tried to reach you today via telephone. Please give us a call at your earliest convenience between 8:30 am and 4:00 pm Monday-Friday so that we may find out more about your spine and get you scheduled with the appropriate provider.      You can reach the Spine Navigator at [phone] option #4 or through this [employer] message.     Thank you for letting us care for you!     Liliana SCHREIBER CMA     Please schedule Physical therapy - referral given today   Will extend light duty for another 2 weeks     Please have labs completed as discussed in a timely manner.  Please take all medications as prescribed and discussed today.  Please have any imaging completed if ordered in time span discussed.    Please return to clinic as discussed, please return sooner as needed for any acute or worsening symptoms.    Dr. Cyr

## 2025-06-18 NOTE — PROGRESS NOTES
Subjective:   Americo Martinez is a 30 year old male who presents for Follow - Up       History/Other:   History of Present Illness  Americo Martinez is a 30 year old male who presents for f/u on back pain.     His back pain began after a muscle strain in early  (after mowing lawn) and has improved significantly. He currently experiences mild discomfort, particularly when standing for a long time. 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 Initially, the pain radiated upwards but is now localized to the lower back, more on the left side.     He has been on modified duty at work, which involves less physical strain, but the note for this  on Monday. He is seeking an extension for a few days to avoid exacerbating his condition.     A lumbar x-ray was performed, and a referral to a spine specialist was made, but he has not yet scheduled the appointment due to issues with his phone. He has been using heating pads and had been taking ibuprofen, but has reduced its use as the pain decreased.     No worsening or new symptoms.  No falls, incontinence, or weakness.   Walking is generally fine, but standing for 45 minutes to an hour can cause discomfort.     Chief Complaint Reviewed and Verified  No Further Nursing Notes to   Review  Allergies Reviewed  Medications Reviewed         ROS - see HPI    Objective:   /82   Pulse 85   Temp 97.4 °F (36.3 °C)   SpO2 96%  Estimated body mass index is 29.43 kg/m² as calculated from the following:    Height as of 25: 6' (1.829 m).    Weight as of 25: 217 lb (98.4 kg).  Results         Physical Exam  VITALS: Reviewed, weight and BMI reviewed.  GENERAL: Healthy appearing, well-developed, no acute distress.  LUNGS: no increased work of breathing, no conversational dyspnea.  SKIN: Warm, well perfused, no skin rashes or abnormal lesions.  MUSCULOSKELETAL: No spinal tenderness, step-offs, or masses.  Normal gait. Mild tenderness over lower lumbar paraspinals without erythema, warmth, or skin changes. Able to flex at waist to ~45 degrees, limited extension to only ~10 degrees 2/2 discomfort.   NEUROLOGICAL: Alert and oriented times 3, ambulating with no limitations, normal muscle strength and tone, no focal deficits.    Assessment & Plan:   1. Acute bilateral low back pain without sciatica (Primary)  -     Physical Therapy Referral - [facility]    Assessment & Plan  Low back pain/muscle strain  -Improving muscle strain causing mild discomfort. X-ray of lumbar spine reviewed.   - Extend modified duty for two weeks.  - Initiate physical therapy for back strengthening and movement techniques. Referral placed today, pt to call and schedule right away.   - Follow up with spine specialist if symptoms persist or there is need for extension of modified duties for work; to schedule appt   - Use heating pads and perform stretches.    Instructions provided as documented in the AVS.    The patient indicated understanding of the diagnosis and agreed with the plan of care.    Red flag s/s reviewed and discussed for conditions listed including concerns for prompt ED evaluation  Medical compliance with plan discussed and risks of non-compliance reviewed  Education completed on disease process, etiology & prognosis  Proper usage and side effects of medications reviewed & discussed    Return to clinic as clinically indicated or as discussed          Marianne Cyr DO, 6/18/2025, 2:18 PM

## 2025-07-01 ENCOUNTER — TELEPHONE (OUTPATIENT)
Dept: FAMILY MEDICINE CLINIC | Facility: CLINIC | Age: 30
End: 2025-07-01

## 2025-07-01 NOTE — TELEPHONE ENCOUNTER
Patient called said that  Gave him a note for restricted duty. Per patient he is feeling better and would like a note clearing him to return to full duty

## 2025-07-01 NOTE — TELEPHONE ENCOUNTER
Last office visit with Dr. Cyr on 6/17/2025 for low back pain.    Per letter on could return to work on 6/18/2025 - work limitations through tomorrow July 2

## (undated) NOTE — LETTER
Date: 6/9/2025    Patient Name: Americo Martinez          To Whom it may concern:    This letter has been written at the patient's request. The above patient was seen at [facility] for treatment of a medical condition.    The patient may return to work on 6/10/2025 with the following limitations: no lifting over 10 lbs, and no excessive bending/twisting/prolonged standing for 1 week.        Sincerely,    DIANDRA Gordon

## (undated) NOTE — LETTER
Date: 6/5/2025    Patient Name: Americo Martinze          To Whom it may concern:    This letter has been written at the patient's request. The above patient was seen at [facility] for treatment of a medical condition.    This patient should be excused from attending work/school from 6/5/25 through 6/9/25.      Sincerely,      Marianne Cyr DO

## (undated) NOTE — LETTER
Date: 7/1/2025    Patient Name: Americo Martinez          To Whom it may concern:    This letter has been written at the patient's request. The above patient was seen at [facility] for treatment of a medical condition.    The patient may return to work with the following limitations - none.        Sincerely,    Marianne Cyr, DO

## (undated) NOTE — LETTER
Date: 6/17/2025    Patient Name: Americo Martinez               To Whom it may concern:     This letter has been written at the patient's request. The above patient was seen at [facility] for treatment of a medical condition.    The patient may return to work on 6/18/2025 with the following limitations: no lifting over 10 lbs, and no excessive bending/twisting/prolonged standing for 2 week.            Sincerely,    Marianne Cyr, DO